# Patient Record
(demographics unavailable — no encounter records)

---

## 2025-02-04 NOTE — HISTORY OF PRESENT ILLNESS
[de-identified] : Patient is a 52-year-old gentleman who presents for follow-up via telemedicine. He gave explicit consent to a remote visit and logged on to the Teams platform. Total time of conversation was 7 minutes and 2 seconds.  I conducted this telemedicine appointment from my office.  Patient was located at home.

## 2025-02-04 NOTE — DISCUSSION/SUMMARY
[de-identified] : Patient will continue physical therapy directed to improve his patellofemoral tracking. Patient will forward MRI results to the office via e-mail.

## 2025-02-04 NOTE — PHYSICAL EXAM
[de-identified] : Physical exam had normal limitations of Telemedicine appointment. Patient walks without any limp. He reports slight pain with range of motion.  Patient demonstrates significant tenderness to palpation of the lateral joint line and patellofemoral articulation.

## 2025-02-04 NOTE — DISCUSSION/SUMMARY
[de-identified] : Patient will continue physical therapy directed to improve his patellofemoral tracking. Patient will forward MRI results to the office via e-mail.

## 2025-02-04 NOTE — PHYSICAL EXAM
[de-identified] : Physical exam had normal limitations of Telemedicine appointment. Patient walks without any limp. He reports slight pain with range of motion.  Patient demonstrates significant tenderness to palpation of the lateral joint line and patellofemoral articulation.

## 2025-02-04 NOTE — HISTORY OF PRESENT ILLNESS
[de-identified] : Patient is a 52-year-old gentleman who presents for follow-up via telemedicine. He gave explicit consent to a remote visit and logged on to the Teams platform. Total time of conversation was 7 minutes and 2 seconds.  I conducted this telemedicine appointment from my office.  Patient was located at home.

## 2025-05-20 NOTE — HISTORY OF PRESENT ILLNESS
[de-identified] : 53-year-old male comes in with numbness and tingling in both hands.  He did have an EMG test performed in the past that is unavailable for review right now.  The numbness and tingling bothers him at night.  He will wakes up from sleep at night with numbness and tingling throughout the entire arm.  He does have a history of injury to his neck.  He also had injuries to the shoulders.  The numbness has been going on for several years.  It is worse now.  He has tried wearing a brace that has not helped.

## 2025-05-20 NOTE — PHYSICAL EXAM
[de-identified] : Patient has positive Tinel's and median nerve compression test bilaterally.  Patient has normal capillary refill.  Patient has diminished sensation in median nerve distribution.  There is no erythema ecchymoses or abrasions.

## 2025-05-20 NOTE — ASSESSMENT
[FreeTextEntry1] : Patient will give us the EMG results in the near future.  He does have a pending new EMG test that is trying to be authorized.  More than likely will need surgical intervention.  Will make arrangements to see him back in 6 weeks for reevaluation to discuss the test results and possibly set up surgery for carpal tunnel release.  Risk and benefits of the surgery and indications were discussed.